# Patient Record
Sex: MALE | Race: WHITE | NOT HISPANIC OR LATINO
[De-identification: names, ages, dates, MRNs, and addresses within clinical notes are randomized per-mention and may not be internally consistent; named-entity substitution may affect disease eponyms.]

---

## 2017-03-15 ENCOUNTER — APPOINTMENT (OUTPATIENT)
Dept: SURGERY | Facility: CLINIC | Age: 82
End: 2017-03-15

## 2017-05-03 ENCOUNTER — APPOINTMENT (OUTPATIENT)
Dept: ENDOCRINOLOGY | Facility: CLINIC | Age: 82
End: 2017-05-03

## 2018-04-14 ENCOUNTER — HOSPITAL ENCOUNTER (EMERGENCY)
Dept: HOSPITAL 74 - JER | Age: 83
Discharge: HOME | End: 2018-04-14
Payer: COMMERCIAL

## 2018-04-14 VITALS — SYSTOLIC BLOOD PRESSURE: 122 MMHG | DIASTOLIC BLOOD PRESSURE: 68 MMHG | HEART RATE: 79 BPM

## 2018-04-14 VITALS — BODY MASS INDEX: 23.1 KG/M2

## 2018-04-14 VITALS — TEMPERATURE: 98.2 F

## 2018-04-14 DIAGNOSIS — N40.1: Primary | ICD-10-CM

## 2018-04-14 DIAGNOSIS — K59.00: ICD-10-CM

## 2018-04-14 DIAGNOSIS — E11.9: ICD-10-CM

## 2018-04-14 DIAGNOSIS — C61: ICD-10-CM

## 2018-04-14 DIAGNOSIS — R33.8: ICD-10-CM

## 2018-04-14 LAB
APPEARANCE UR: (no result)
BILIRUB UR STRIP.AUTO-MCNC: NEGATIVE MG/DL
COLOR UR: (no result)
KETONES UR QL STRIP: (no result)
LEUKOCYTE ESTERASE UR QL STRIP.AUTO: NEGATIVE
MUCOUS THREADS URNS QL MICRO: (no result)
NITRITE UR QL STRIP: NEGATIVE
PH UR: 6 [PH] (ref 5–8)
PROT UR QL STRIP: (no result)
PROT UR QL STRIP: NEGATIVE
RBC # UR STRIP: (no result) /UL
SP GR UR: 1.01 (ref 1–1.03)
UROBILINOGEN UR STRIP-MCNC: NEGATIVE MG/DL (ref 0.2–1)

## 2018-04-14 PROCEDURE — 3E033NZ INTRODUCTION OF ANALGESICS, HYPNOTICS, SEDATIVES INTO PERIPHERAL VEIN, PERCUTANEOUS APPROACH: ICD-10-PCS

## 2018-04-14 PROCEDURE — 0T9B70Z DRAINAGE OF BLADDER WITH DRAINAGE DEVICE, VIA NATURAL OR ARTIFICIAL OPENING: ICD-10-PCS

## 2018-04-14 PROCEDURE — 3E0337Z INTRODUCTION OF ELECTROLYTIC AND WATER BALANCE SUBSTANCE INTO PERIPHERAL VEIN, PERCUTANEOUS APPROACH: ICD-10-PCS

## 2018-04-14 RX ADMIN — HYDROMORPHONE HYDROCHLORIDE ONE: 1 INJECTION, SOLUTION INTRAMUSCULAR; INTRAVENOUS; SUBCUTANEOUS at 10:10

## 2018-04-14 RX ADMIN — HYDROMORPHONE HYDROCHLORIDE ONE MG: 1 INJECTION, SOLUTION INTRAMUSCULAR; INTRAVENOUS; SUBCUTANEOUS at 09:15

## 2018-04-14 NOTE — PDOC
History of Present Illness





- General


Chief Complaint: Pain, Acute


Stated Complaint: ABD PAIN,URINARY PROBLEM


Time Seen by Provider: 04/14/18 07:30


History Source: Patient


Exam Limitations: No Limitations





- History of Present Illness


Travel History: No


Initial Comments: 





04/14/18 09:00


HPI: C3-year-old male presents to the emergency room with complaints of 

suprapubic pressure. He was seen here yesterday with complaints of dysuria and 

inability to void with urinary retention. Attempts were made to pass a Franco 

catheter. Urology on-call, Dr. Marjorie Cowan came in to see the patient. Just 

prior to attempt of placing another Franco with  he went in and use the 

bathroom for bowel movement and was able to urinate a little bit. He was then 

discharged. He now returns because after several hours at home he's unable to 

void again and he has suprapubic pain and pressure.





Chief Compliant:inability to urinate





Pain location:suprapubic


Duration:several hours


Modifying factors:none


Quality:pressure


Radiating:denies


Severity:10 out of 10


Time:Constant





PMH: Diabetes, constipation, enlarged prostate





FH:


Pt has not recently traveled outside the country in the last 30 days. 


Pt has not been in contact with people who have traveled out of the country, in 

contact with people who have been ill with fever, n, v, d. 





SH:


smoking use: NONE


illicit drug use: NONE


alcohol use: NONE


employment/educational status:


sexual history:





PSH: Urinary strictures





Home med use noted on MAR


Allergies:NKA





Immunizations:





PCP: Dr. Truong








04/14/18 09:04








Past History





- Past Medical History


Allergies/Adverse Reactions: 


 Allergies











Allergy/AdvReac Type Severity Reaction Status Date / Time


 


No Known Allergies Allergy   Verified 04/14/18 00:14











Home Medications: 


Ambulatory Orders





NK [No Known Home Medication]  04/14/18 








COPD: No





- Immunization History


Immunization Up to Date: Yes





- Suicide/Smoking/Psychosocial Hx


Smoking History: Never smoked


Have you smoked in the past 12 months: No


Hx Alcohol Use: No


Drug/Substance Use Hx: No





**Review of Systems





- Review of Systems


Able to Perform ROS?: Yes


Constitutional: No: Chills, Fever


HEENTM: No: Symptoms Reported


Respiratory: No: Symptoms reported


Cardiac (ROS): No: Symptoms Reported


ABD/GI: Yes: Abdominal Distended, Abd. Pain w/ defecation, Constipated.  No: 

Diarrhea, Nausea, Vomiting


: Yes: See HPI, Dysuria, Pain, Other (urinary retention).  No: Burning


Musculoskeletal: No: Symptoms Reported


Integumentary: No: Symptoms Reported


Neurological: No: Symptoms reported





*Physical Exam





- Vital Signs


 Last Vital Signs











Temp Pulse Resp BP Pulse Ox


 


 98.2 F   90   18   137/72   98 


 


 04/14/18 05:30  04/14/18 05:30  04/14/18 05:30  04/14/18 05:30  04/14/18 05:30














- Physical Exam


General Appearance: Yes: Apparent Distress


HEENT: positive: Normal Voice


Respiratory/Chest: positive: Lungs Clear


Cardiovascular: positive: Regular Rate


Male Genitalia: positive: normal genitalia, other (unable to void).  negative: 

normal prostate, testicular tenderness, testicular mass, inguinal hernia, hernia

, hematuria


Extremity: positive: Normal Inspection


Integumentary: positive: Dry, Warm





Medical Decision Making





- Medical Decision Making





04/14/18 09:06


Patient initially seen and examined. Patient is stating he is complaining of 

urinary retention and suprapubic pain.


Plan


Attempted to place a 14, 12, French catheter in through meatus. Restriction 

that at the prostate. Unable to pass the prostate and have urinary drainage. 

Lidocaine gel had been attempted. Patient received some pain medication however 

still unable to void.


Contacted  on-call, Dr. Marjorie Cowan to come in and place Franco.


04/14/18 10:18








Gave patient a second dose of morphine for his pain while he awaits Dr. Marjorie Cowan.


Dr. Marjorie Cowan came in and placed a Franco catheter inserted to follow-up in the 

office in one week. Patient is much relieved and is not in any distress. He had 

over 800 mL in his bladder. UA and C&S are being sent.





We'll attempt to reach out for his son to pick him up as he drove here however 

he has been receiving narcotics.


04/14/18 11:56








Was started on antibiotics yesterday for a UTI.  Son at bedside and will send 

him home with franco and leg bag with instructions. 








*DC/Admit/Observation/Transfer


Diagnosis at time of Disposition: 


 Urinary retention








- Discharge Dispostion


Disposition: HOME


Condition at time of disposition: Improved


Admit: No





- Referrals


Referrals: 


Sofia Truong MD [Primary Care Provider] - 





- Patient Instructions


Printed Discharge Instructions:  How to Care for Your Franco Catheter -- Male, 

DI for Urinary Retention in Men


Additional Instructions: 


Discharge instructions





1.  Please follow up with your primary physician within the next few days and 

explain that you have been seen here in the Emergency Room. 





2.  If you experience any worsening of symptoms, please return to the ER





3. Rest, wash hands prior to caring for your Franco catheter and after. Empty 

the bag frequently.





4.  Drink plenty of water including cranberry juice





Continue to take the Flomax and antibiotics as prescribed prior





Follow-up with the urologist next Friday. Call on Monday to schedule an 

appointment.




















- Post Discharge Activity

## 2018-05-03 ENCOUNTER — HOSPITAL ENCOUNTER (OUTPATIENT)
Dept: HOSPITAL 74 - JER | Age: 83
Setting detail: OBSERVATION
LOS: 1 days | Discharge: HOME | End: 2018-05-04
Attending: SPECIALIST | Admitting: SPECIALIST
Payer: COMMERCIAL

## 2018-05-03 VITALS — BODY MASS INDEX: 23 KG/M2

## 2018-05-03 DIAGNOSIS — R26.9: ICD-10-CM

## 2018-05-03 DIAGNOSIS — R42: Primary | ICD-10-CM

## 2018-05-03 DIAGNOSIS — Z79.82: ICD-10-CM

## 2018-05-03 DIAGNOSIS — I10: ICD-10-CM

## 2018-05-03 DIAGNOSIS — E78.5: ICD-10-CM

## 2018-05-03 DIAGNOSIS — N40.0: ICD-10-CM

## 2018-05-03 DIAGNOSIS — Z79.84: ICD-10-CM

## 2018-05-03 DIAGNOSIS — E11.9: ICD-10-CM

## 2018-05-03 LAB
ALBUMIN SERPL-MCNC: 3.9 G/DL (ref 3.4–5)
ALP SERPL-CCNC: 54 U/L (ref 45–117)
ALT SERPL-CCNC: 15 U/L (ref 12–78)
ANION GAP SERPL CALC-SCNC: 8 MMOL/L (ref 8–16)
APPEARANCE UR: CLEAR
AST SERPL-CCNC: 13 U/L (ref 15–37)
BASOPHILS # BLD: 0.3 % (ref 0–2)
BILIRUB SERPL-MCNC: 0.9 MG/DL (ref 0.2–1)
BILIRUB UR STRIP.AUTO-MCNC: NEGATIVE MG/DL
BNP SERPL-MCNC: 217.46 PG/ML (ref 5–450)
BUN SERPL-MCNC: 19 MG/DL (ref 7–18)
CALCIUM SERPL-MCNC: 10.6 MG/DL (ref 8.5–10.1)
CHLORIDE SERPL-SCNC: 107 MMOL/L (ref 98–107)
CO2 SERPL-SCNC: 26 MMOL/L (ref 21–32)
COLOR UR: (no result)
CREAT SERPL-MCNC: 1.1 MG/DL (ref 0.7–1.3)
DEPRECATED RDW RBC AUTO: 14.5 % (ref 11.9–15.9)
EOSINOPHIL # BLD: 1.2 % (ref 0–4.5)
GLUCOSE SERPL-MCNC: 120 MG/DL (ref 74–106)
HCT VFR BLD CALC: 39.5 % (ref 35.4–49)
HGB BLD-MCNC: 13.1 GM/DL (ref 11.7–16.9)
INR BLD: 1.04 (ref 0.82–1.09)
KETONES UR QL STRIP: NEGATIVE
LEUKOCYTE ESTERASE UR QL STRIP.AUTO: NEGATIVE
LYMPHOCYTES # BLD: 21.8 % (ref 8–40)
MCH RBC QN AUTO: 29.3 PG (ref 25.7–33.7)
MCHC RBC AUTO-ENTMCNC: 33.3 G/DL (ref 32–35.9)
MCV RBC: 88 FL (ref 80–96)
MONOCYTES # BLD AUTO: 6.9 % (ref 3.8–10.2)
NEUTROPHILS # BLD: 69.8 % (ref 42.8–82.8)
NITRITE UR QL STRIP: NEGATIVE
PH UR: 7 [PH] (ref 5–8)
PLATELET # BLD AUTO: 238 K/MM3 (ref 134–434)
PMV BLD: 9.1 FL (ref 7.5–11.1)
POTASSIUM SERPLBLD-SCNC: 4.4 MMOL/L (ref 3.5–5.1)
PROT SERPL-MCNC: 7.2 G/DL (ref 6.4–8.2)
PROT UR QL STRIP: NEGATIVE
PROT UR QL STRIP: NEGATIVE
PT PNL PPP: 11.7 SEC (ref 9.7–13)
RBC # BLD AUTO: 4.49 M/MM3 (ref 4–5.6)
SODIUM SERPL-SCNC: 141 MMOL/L (ref 136–145)
SP GR UR: 1.01 (ref 1–1.03)
UROBILINOGEN UR STRIP-MCNC: NEGATIVE MG/DL (ref 0.2–1)
WBC # BLD AUTO: 9 K/MM3 (ref 4–10)

## 2018-05-03 PROCEDURE — G0378 HOSPITAL OBSERVATION PER HR: HCPCS

## 2018-05-03 RX ADMIN — MECLIZINE HYDROCHLORIDE PRN MG: 25 TABLET ORAL at 17:47

## 2018-05-03 NOTE — EKG
Test Reason : 

Blood Pressure : ***/*** mmHG

Vent. Rate : 052 BPM     Atrial Rate : 052 BPM

   P-R Int : 158 ms          QRS Dur : 088 ms

    QT Int : 440 ms       P-R-T Axes : 058 008 029 degrees

   QTc Int : 409 ms

 

SINUS BRADYCARDIA

OTHERWISE NORMAL ECG

NO PREVIOUS ECGS AVAILABLE

Confirmed by LUCIANO MORA MD ([id number]) on 5/3/2018 1:00:43 PM

 

Referred By:             Confirmed By:LUCIANO MORA MD

## 2018-05-03 NOTE — PDOC
History of Present Illness





- General


Chief Complaint: Lightheaded


Stated Complaint: Lightheaded


Time Seen by Provider: 05/03/18 10:53


History Source: Patient


Exam Limitations: No Limitations





- History of Present Illness


Initial Comments: 





05/03/18 13:43


83M with pmh of HTN, HCL, DM2, constipation, and enlarged prostate, who 

presents to the emergency department with acute onset of dizziness/

lightheadedness/vertigo since earlier this morning. Patient reports being at 

baseline last around 5:30 when he woke up to urinate. He woke up 2 hours later 

and upon sitting on the side of the bed, fell like he was off balance, "off his 

equilibrium". 


 


Denies abdominal pain, nausea, vomiting, diarrhea, constipation, melena, or 

hematochezia. He denies any fever, chills, cough, headache, lightheadedness, or 

focal weakness. 


Previously evaluated by neurologist, Dr. Cazares, for difficulty with balance over 

the past year. At the time the patient had an MRI done, which was negative. The 

patient denies any history of strokes or trauma. He denies any chest pain, 

shortness of breath, diaphoresis, or palpitations.


05/03/18 13:46








Past History





- Past Medical History


Allergies/Adverse Reactions: 


 Allergies











Allergy/AdvReac Type Severity Reaction Status Date / Time


 


No Known Allergies Allergy   Verified 04/14/18 00:14











Home Medications: 


Ambulatory Orders





Amlodipine Besylate 5 mg PO DAILY 05/03/18 


Aspirin [ASA -] 81 mg PO DAILY 05/03/18 


Cholecalciferol (Vitamin D3) [Vitamin D -] 400 units PO DAILY 05/03/18 


Metformin HCl [Glucophage] 500 mg PO DAILY 05/03/18 


Pravastatin Sodium [Pravachol (Nf)] 40 mg PO HS 05/03/18 


Sennosides [Senna -] PO HS 05/03/18 


Sitagliptin Phosphate [Januvia] 100 mg PO DAILY 05/03/18 


Vitamin B Complex 1 tab PO DAILY 05/03/18 








COPD: No





- Immunization History


Immunization Up to Date: Yes





- Suicide/Smoking/Psychosocial Hx


Smoking History: Never smoked


Have you smoked in the past 12 months: No


Information on smoking cessation initiated: No


Hx Alcohol Use: Yes (social)


Drug/Substance Use Hx: No





**Review of Systems





- Review of Systems


Able to Perform ROS?: Yes


Is the patient limited English proficient: No


Constitutional: No: Symptoms Reported


HEENTM: No: Symptoms Reported


Respiratory: No: Symptoms reported


Cardiac (ROS): No: Symptoms Reported


ABD/GI: No: Symptoms Reported


: No: Symptoms Reported


Musculoskeletal: No: Symptoms Reported


Integumentary: No: Symptoms Reported


Neurological: Yes: Unsteady Gait.  No: Headache, Numbness


Hematologic/Lymphatic: No: See HPI


All Other Systems: Reviewed and Negative





*Physical Exam





- Vital Signs


 Last Vital Signs











Temp Pulse Resp BP Pulse Ox


 


 98.1 F   68   16   155/89   100 


 


 05/03/18 10:47  05/03/18 10:47  05/03/18 10:47  05/03/18 10:47  05/03/18 10:47














- Physical Exam


General Appearance: Yes: Nourished, Appropriately Dressed.  No: Apparent 

Distress


HEENT: positive: EOMI, RUSH, Normal ENT Inspection


Neck: negative: Tender


Respiratory/Chest: positive: Lungs Clear, Normal Breath Sounds.  negative: 

Chest Tender, Respiratory Distress


Cardiovascular: positive: Regular Rhythm, Regular Rate, S1, S2


Gastrointestinal/Abdominal: positive: Normal Bowel Sounds, Flat, Soft.  negative

: Tender


Musculoskeletal: positive: Normal Inspection.  negative: CVA Tenderness


Extremity: positive: Normal Capillary Refill, Normal Inspection, Normal Range 

of Motion


Integumentary: positive: Normal Color, Dry, Warm


Neurologic: positive: CNs II-XII NML intact, Fully Oriented, Alert, Normal Mood/

Affect, Other (positive romberg, gait deviated to the left, off balance.)





ED Treatment Course





- LABORATORY


CBC & Chemistry Diagram: 


 05/03/18 11:30





 05/03/18 11:30





Medical Decision Making





- Medical Decision Making





05/03/18 13:52


83m\ with vertigo and some evidence of cerebella dysfuntion. 


Will order basic labs, ct head and admit for MRI


05/03/18 16:26


All labs and UA negative. CT negative. 


Spoke to MCKAY Conti for Dr. Herron who accepted the patient . 


PLaced consult and spoke to Dr. Salas for Neurology








*DC/Admit/Observation/Transfer


Diagnosis at time of Disposition: 


 Vertigo








- Discharge Dispostion


Admit: Yes





- Referrals





- Patient Instructions





- Post Discharge Activity

## 2018-05-03 NOTE — PDOC
Attending Attestation





- Resident


Resident Name: Romeo Carrera





- ED Attending Attestation


I have performed the following: I have examined & evaluated the patient, The 

case was reviewed & discussed with the resident, I agree w/resident's findings 

& plan, Exceptions are as noted





<Ирина May - Last Filed: 05/03/18 11:43>





- HPI


HPI: 





05/03/18 11:56


The patient is a 83 year old male, with a significant past medical history of 

hypertension, hyperlipidemia, diabetes, constipation, and enlarged prostate, 

who presents to the emergency department with acute onset of dizziness since 

earlier this morning. Patient reports going to bed at his baseline last night 

and waking up every two hours. Patient reports he was last well at around 05:30

, but then around 07:30 when he went to the bathroom he began to feel as if his 

equilibrium was off and he was falling toward the left. He denies any 

associated abdominal pain, nausea, vomiting, diarrhea, constipation, melena, or 

hematochezia. He denies any fever, chills, cough, headache, lightheadedness, or 

focal weakness. The patient reports he has been evaluated by a neurologist, Dr. Cazares, for difficulty with balance over the past year. At the time the patient 

had an MRI done, which was negative. The patient denies any history of strokes 

or trauma. He denies any chest pain, shortness of breath, diaphoresis, or 

palpitations.





Allergies: NKDA


PCP: Dr. Truong





- Physicial Exam


PE: 





05/03/18 11:56


GENERAL: Awake, alert, and fully oriented, in no acute distress


HEAD: No signs of trauma


EYES: PERRLA, EOMI, sclera anicteric, conjunctiva clear


ENT: Auricles normal inspection, hearing grossly normal, nares patent. Moist 

mucosa


NECK: Normal ROM, supple, no lymphadenopathy, JVD, or masses


LUNGS: Breath sounds equal, clear to auscultation bilaterally.  No wheezes, and 

no crackles


HEART: Regular rate and rhythm, normal S1 and S2, no murmurs, rubs or gallops


ABDOMEN: Soft, nontender, normoactive bowel sounds.  No guarding, no rebound.  

No masses


EXTREMITIES: Normal range of motion, no edema.  No clubbing or cyanosis. No 

cords, erythema, or tenderness. DP/PT pulses 2+ and symmetric. 


NEUROLOGICAL: Positive romberg's. Unsteady gait. Moves all extremities.  Normal 

speech. No focal weakness. Cranial nerves II-XII are grossly intact. Bilateral 

hand tremors.


SKIN: Warm, Dry, normal turgor, no rashes or lesions noted.








- Medical Decision Making





05/03/18 11:57





Documentation prepared by Ry Rodrigues, acting as medical scribe for Ирина May MD.





First call placed place to Dr. Truong at 14:16. Awaiting call back.


Case discussed wih MCKAY Conti. Dr. Truong requests Dr. Salas for Neurology 

consult.





First call placed to Dr. Salas at 14:26. Awaiting call back.





<Ry Rodrigues - Last Filed: 05/03/18 14:29>





ED Treatment Course





- LABORATORY


CBC & Chemistry Diagram: 


 05/03/18 11:30





 05/03/18 11:30





- ADDITIONAL ORDERS


Additional order review: 


 Laboratory  Results











  05/03/18 05/03/18 05/03/18





  12:00 11:45 11:30


 


PT with INR    11.70


 


INR    1.04


 


PTT (Actin FS)   29.4 


 


Sodium   


 


Potassium   


 


Chloride   


 


Carbon Dioxide   


 


Anion Gap   


 


BUN   


 


Creatinine   


 


Creat Clearance w eGFR   


 


Random Glucose   


 


Calcium   


 


Total Bilirubin   


 


AST   


 


ALT   


 


Alkaline Phosphatase   


 


B-Natriuretic Peptide   


 


Total Protein   


 


Albumin   


 


Urine Color  Ltyellow  


 


Urine Appearance  Clear  


 


Urine pH  7.0  


 


Ur Specific Gravity  1.012  


 


Urine Protein  Negative  


 


Urine Glucose (UA)  Negative  


 


Urine Ketones  Negative  


 


Urine Blood  Negative  


 


Urine Nitrite  Negative  


 


Urine Bilirubin  Negative  


 


Urine Urobilinogen  Negative  


 


Ur Leukocyte Esterase  Negative  














  05/03/18





  11:30


 


PT with INR 


 


INR 


 


PTT (Actin FS) 


 


Sodium  141


 


Potassium  4.4


 


Chloride  107


 


Carbon Dioxide  26


 


Anion Gap  8


 


BUN  19 H


 


Creatinine  1.1


 


Creat Clearance w eGFR  > 60


 


Random Glucose  120 H


 


Calcium  10.6 H


 


Total Bilirubin  0.9


 


AST  13 L


 


ALT  15


 


Alkaline Phosphatase  54


 


B-Natriuretic Peptide  217.46


 


Total Protein  7.2


 


Albumin  3.9


 


Urine Color 


 


Urine Appearance 


 


Urine pH 


 


Ur Specific Gravity 


 


Urine Protein 


 


Urine Glucose (UA) 


 


Urine Ketones 


 


Urine Blood 


 


Urine Nitrite 


 


Urine Bilirubin 


 


Urine Urobilinogen 


 


Ur Leukocyte Esterase 








 











  05/03/18





  11:30


 


RBC  4.49


 


MCV  88.0


 


MCHC  33.3


 


RDW  14.5


 


MPV  9.1


 


Neutrophils %  69.8


 


Lymphocytes %  21.8


 


Monocytes %  6.9


 


Eosinophils %  1.2


 


Basophils %  0.3














- RADIOLOGY


Radiograph Interpretation: 





05/03/18 14:29


EXAM: Head CT


IMPRESSION: Moderate atrophy and mild periventricular chronic microvascular 

ischemic disease changes without gross evidence of acute intracranial 

pathology. Correlate clinically to determine further evaluation and follow-up. 





- Medications


Given in the ED: 


ED Medications














Discontinued Medications














Generic Name Dose Route Start Last Admin





  Trade Name Freq  PRN Reason Stop Dose Admin


 


Sodium Chloride  1,000 mls @ 1,000 mls/hr  05/03/18 11:24  05/03/18 12:06





  Normal Saline -  IV  05/03/18 12:23  1,000 mls/hr





  ASDIR STA   Administration


 


Meclizine HCl  25 mg  05/03/18 11:24  05/03/18 12:06





  Antivert -  PO  05/03/18 11:25  25 mg





  ONCE ONE   Administration


 


Ondansetron HCl  4 mg  05/03/18 11:24  05/03/18 12:06





  Zofran Injection  IVPUSH  05/03/18 11:25  4 mg





  ONCE ONE   Administration














<Ry Rodrigues - Last Filed: 05/03/18 14:29>

## 2018-05-03 NOTE — CON.NEURO
Consult





- History of Present Illness


History of Present Illness: 


83M with pmh of HTN, HCL, DM2, constipation, and enlarged prostate, who 

presents to the emergency department with acute onset of dizziness/

lightheadedness/vertigo since earlier this morning. Patient reports being at 

baseline last around 5:30 when he woke up to urinate. He woke up 2 hours later 

and upon sitting on the side of the bed, fell like he was off balance, "off his 

equilibrium". 


 


Denies abdominal pain, nausea, vomiting, diarrhea, constipation, melena, or 

hematochezia. He denies any fever, chills, cough, headache, lightheadedness, or 

focal weakness. 


Previously evaluated by neurologist, Dr. Cazares, for difficulty with balance over 

the past year. At the time the patient had an MRI done, which was negative. The 

patient denies any history of strokes or trauma. He denies any chest pain, 

shortness of breath, diaphoresis, or palpitations.





CT HD


IMPRESSION: Moderate atrophy and mild periventricular chronic microvascular 

ischemic disease changes without gross evidence of acute intracranial 

pathology. Correlate clinically to determine further evaluation and follow-up. 








- Alcohol/Substance Use


Hx Alcohol Use: Yes (social)





- Smoking History


Smoking history: Never smoked


Have you smoked in the past 12 months: No





Home Medications





- Allergies


Allergies/Adverse Reactions: 


 Allergies











Allergy/AdvReac Type Severity Reaction Status Date / Time


 


No Known Allergies Allergy   Verified 04/14/18 00:14














- Home Medications


Home Medications: 


Ambulatory Orders





Amlodipine Besylate 5 mg PO DAILY 05/03/18 


Aspirin [ASA -] 81 mg PO DAILY 05/03/18 


Cholecalciferol (Vitamin D3) [Vitamin D -] 400 units PO DAILY 05/03/18 


Metformin HCl [Glucophage] 500 mg PO DAILY 05/03/18 


Pravastatin Sodium [Pravachol (Nf)] 40 mg PO HS 05/03/18 


Sennosides [Senna -] PO HS 05/03/18 


Sitagliptin Phosphate [Januvia] 100 mg PO DAILY 05/03/18 


Vitamin B Complex 1 tab PO DAILY 05/03/18 











Physical Exam-Neuro


Vital Signs: 


 Vital Signs











Temperature  98.1 F   05/03/18 10:47


 


Pulse Rate  68   05/03/18 10:47


 


Respiratory Rate  16   05/03/18 10:47


 


Blood Pressure  155/89   05/03/18 10:47


 


O2 Sat by Pulse Oximetry (%)  100   05/03/18 10:47











Labs: 


 CBC, BMP





 05/03/18 11:30 





 05/03/18 11:30 





 INR, PTT











INR  1.04  (0.82-1.09)   05/03/18  11:30    














- Neuro Exam


Level Of Consciousness: Yes: Alert (awake , alert oriented , EOMi, no nystagmus

, no facial, no dysmetria or ataxia, when he stands unstaedy , )





Imaging





- Results


Cat Scan: Report Reviewed, Image Reviewed





Problem List





- Problems


(1) Abnormal gait


Code(s): R26.9 - UNSPECIFIED ABNORMALITIES OF GAIT AND MOBILITY   





(2) Vertigo


Code(s): R42 - DIZZINESS AND GIDDINESS   





Assessment/Plan


83M with pmh of HTN, HCL, DM2, constipation, and enlarged prostate, who 

presents to the emergency department with acute onset of dizziness/

lightheadedness/vertigo since earlier this morning- r/o central vertigo , r/o 

CVA ;positional dizziness favors peripheral origin 


start ASA 


check MRI BRAIN, 


consider ENT 








Dr Salas

## 2018-05-03 NOTE — HP
Admitting History and Physical





- Admission


Chief Complaint: cant get up / dizzy


History of Present Illness: 











History of Present Illness: 


83M with pmh of HTN, HCL, DM2, constipation, and enlarged prostate, who 

presents to the emergency department with acute onset of dizziness/

lightheadedness/vertigo since earlier this morning. Patient reports being at 

baseline last around 5:30 when he woke up to urinate. He woke up 2 hours later 

and upon sitting on the side of the bed, fell like he was off balance, "off his 

equilibrium". 


 


Denies abdominal pain, nausea, vomiting, diarrhea, constipation, melena, or 

hematochezia. He denies any fever, chills, cough, headache, lightheadedness, or 

focal weakness. 


Previously evaluated by neurologist, Dr. Cazares, for difficulty with balance over 

the past year. At the time the patient had an MRI done, which was negative. The 

patient denies any history of strokes or trauma. He denies any chest pain, 

shortness of breath, diaphoresis, or palpitations.





CT HD


IMPRESSION: Moderate atrophy and mild periventricular chronic microvascular 

ischemic disease changes without gross evidence of acute intracranial 

pathology. Correlate clinically to determine further evaluation and follow-up. 








History Source: Patient, Family Member, Medical Record, Caregiver


Limitations to Obtaining History: No Limitations





- Smoking History


Smoking history: Never smoked


Have you smoked in the past 12 months: No





- Alcohol/Substance Use


Hx Alcohol Use: Yes (social)


History of Substance Use: reports: None





- Social History


Usual Living Arrangement: Yes: With Spouse


ADL: Family Assistance


History of Recent Travel: No





Home Medications





- Allergies


Allergies/Adverse Reactions: 


 Allergies











Allergy/AdvReac Type Severity Reaction Status Date / Time


 


No Known Allergies Allergy   Verified 04/14/18 00:14














- Home Medications


Home Medications: 


Ambulatory Orders





Amlodipine Besylate 5 mg PO DAILY 05/03/18 


Aspirin [ASA -] 81 mg PO DAILY 05/03/18 


Cholecalciferol (Vitamin D3) [Vitamin D -] 400 units PO DAILY 05/03/18 


Metformin HCl [Glucophage] 500 mg PO DAILY 05/03/18 


Pravastatin Sodium [Pravachol -] 40 mg PO HS 05/03/18 


Sennosides [Senna -] PO HS 05/03/18 


Sitagliptin Phosphate [Januvia] 100 mg PO DAILY 05/03/18 


Vitamin B Complex 1 tab PO DAILY 05/03/18 


Acetaminophen [Tylenol .Regular Strength -] 650 mg PO Q6H PRN  tablet 05/04/18 


Amlodipine Besylate [Norvasc -] 5 mg PO DAILY  tablet 05/04/18 


Aspirin Coated [Ecotrin -] 81 mg PO DAILY  tablet.ec 05/04/18 


Atorvastatin Ca [Lipitor] 40 mg PO HS  tablet 05/04/18 


Meclizine HCl [Antivert -] 25 mg PO Q8H 30 Days #90 tablet 05/04/18 


Sitagliptin Phosphate [Januvia -] 100 mg PO DAILY@0700  tablet 05/04/18 


metFORMIN HCL [Glucophage -] 500 mg PO DAILY@0700  tablet 05/04/18 











Review of Systems





- Review of Systems


Constitutional: reports: Weakness.  denies: Chills, Diaphoresis, Fever, Malaise

, Night Sweats


Eyes: denies: Blurred Vision, Double Vision, Photophobia


HENT: denies: Difficult Swallowing, Ear Pain, Nasal Congestion


Neck: reports: No Symptoms


Cardiovascular: denies: Chest Pain, Palpitations, Shortness of Breath


Respiratory: denies: Cough


Gastrointestinal: reports: No Symptoms


Genitourinary: reports: No Symptoms


Breasts: reports: No Symptoms Reported


Musculoskeletal: reports: No Symptoms


Integumentary: reports: No Symptoms


Neurological: reports: Other (vertigo)


Hematology/Lymphatic: reports: No Symptoms


Psychiatric: reports: No Symptoms





Physical Examination


Vital Signs: 


 Vital Signs











Temperature  98.2 F   05/03/18 15:30


 


Pulse Rate  68   05/03/18 15:30


 


Respiratory Rate  16   05/03/18 15:30


 


Blood Pressure  145/85   05/03/18 15:30


 


O2 Sat by Pulse Oximetry (%)  96   05/03/18 15:30











Constitutional: Yes: Well Nourished, No Distress, Mild Distress


Eyes: Yes: Conjunctiva Clear, EOM Intact


HENT: Yes: Atraumatic, Normocephalic


Neck: Yes: Supple, Trachea Midline


Cardiovascular: Yes: Regular Rate and Rhythm


Respiratory: Yes: Regular


Gastrointestinal: Yes: Normal Bowel Sounds


...Rectal Exam: Yes: Deferred


Renal/: Yes: WNL


Breast(s): Yes: WNL


Musculoskeletal: Yes: WNL, Muscle Weakness


Extremities: Yes: WNL


Edema: No


Peripheral Pulses WNL: Yes


Integumentary: Yes: WNL


Neurological: Yes: Alert, Oriented


...Motor Strength: WNL


Psychiatric: Yes: Alert, Oriented


Labs: 


 CBC, BMP





 05/03/18 11:30 





 05/03/18 11:30 











Problem List





- Problems


(1) Vertigo


Assessment/Plan: 


neurology evaluation 


ENT evaluation 


MRI 


start meclizine 





Code(s): R42 - DIZZINESS AND GIDDINESS   





(2) Hypertension


Code(s): I10 - ESSENTIAL (PRIMARY) HYPERTENSION   





(3) Hyperlipemia


Assessment/Plan: 


continue meds


Code(s): E78.5 - HYPERLIPIDEMIA, UNSPECIFIED   





(4) Abnormal gait


Assessment/Plan: 


neurology evaluation 


Code(s): R26.9 - UNSPECIFIED ABNORMALITIES OF GAIT AND MOBILITY

## 2018-05-04 VITALS — HEART RATE: 73 BPM | DIASTOLIC BLOOD PRESSURE: 80 MMHG | TEMPERATURE: 98.6 F | SYSTOLIC BLOOD PRESSURE: 121 MMHG

## 2018-05-04 LAB
ANION GAP SERPL CALC-SCNC: 5 MMOL/L (ref 8–16)
BASOPHILS # BLD: 0.2 % (ref 0–2)
BUN SERPL-MCNC: 18 MG/DL (ref 7–18)
CALCIUM SERPL-MCNC: 10.4 MG/DL (ref 8.5–10.1)
CHLORIDE SERPL-SCNC: 107 MMOL/L (ref 98–107)
CHOLEST SERPL-MCNC: 124 MG/DL (ref 50–200)
CO2 SERPL-SCNC: 29 MMOL/L (ref 21–32)
CREAT SERPL-MCNC: 1 MG/DL (ref 0.7–1.3)
DEPRECATED PREALB SERPL-CCNC: 27.3 MG/DL (ref 20–40)
DEPRECATED RDW RBC AUTO: 14.4 % (ref 11.9–15.9)
EOSINOPHIL # BLD: 2.1 % (ref 0–4.5)
GLUCOSE SERPL-MCNC: 104 MG/DL (ref 74–106)
HCT VFR BLD CALC: 38.8 % (ref 35.4–49)
HDLC SERPL-MCNC: 34 MG/DL (ref 40–60)
HGB BLD-MCNC: 13 GM/DL (ref 11.7–16.9)
LYMPHOCYTES # BLD: 22.7 % (ref 8–40)
MAGNESIUM SERPL-MCNC: 2.4 MG/DL (ref 1.8–2.4)
MCH RBC QN AUTO: 29.5 PG (ref 25.7–33.7)
MCHC RBC AUTO-ENTMCNC: 33.6 G/DL (ref 32–35.9)
MCV RBC: 87.9 FL (ref 80–96)
MONOCYTES # BLD AUTO: 9.2 % (ref 3.8–10.2)
NEUTROPHILS # BLD: 65.8 % (ref 42.8–82.8)
PLATELET # BLD AUTO: 229 K/MM3 (ref 134–434)
PMV BLD: 9.3 FL (ref 7.5–11.1)
POTASSIUM SERPLBLD-SCNC: 4.3 MMOL/L (ref 3.5–5.1)
RBC # BLD AUTO: 4.42 M/MM3 (ref 4–5.6)
SODIUM SERPL-SCNC: 141 MMOL/L (ref 136–145)
TRIGL SERPL-MCNC: 174 MG/DL (ref 35–160)
WBC # BLD AUTO: 8.5 K/MM3 (ref 4–10)

## 2018-05-04 PROCEDURE — 3E033GC INTRODUCTION OF OTHER THERAPEUTIC SUBSTANCE INTO PERIPHERAL VEIN, PERCUTANEOUS APPROACH: ICD-10-PCS | Performed by: SPECIALIST

## 2018-05-04 PROCEDURE — 3E0337Z INTRODUCTION OF ELECTROLYTIC AND WATER BALANCE SUBSTANCE INTO PERIPHERAL VEIN, PERCUTANEOUS APPROACH: ICD-10-PCS | Performed by: SPECIALIST

## 2018-05-04 RX ADMIN — MECLIZINE HYDROCHLORIDE PRN MG: 25 TABLET ORAL at 10:17

## 2018-05-04 NOTE — DS
Physical Examination


Vital Signs: 


 Vital Signs











Temperature  98.6 F   05/04/18 15:19


 


Pulse Rate  73   05/04/18 15:19


 


Respiratory Rate  18   05/04/18 15:19


 


Blood Pressure  121/80   05/04/18 15:19


 


O2 Sat by Pulse Oximetry (%)  97   05/04/18 09:00











Findings/Remarks: 








05/03/18 13:43


83M with pmh of HTN, HCL, DM2, constipation, and enlarged prostate, who 

presents to the emergency department with acute onset of dizziness/

lightheadedness/vertigo since earlier this morning. Patient reports being at 

baseline last around 5:30 when he woke up to urinate. He woke up 2 hours later 

and upon sitting on the side of the bed, fell like he was off balance, "off his 

equilibrium". 


 


Denies abdominal pain, nausea, vomiting, diarrhea, constipation, melena, or 

hematochezia. He denies any fever, chills, cough, headache, lightheadedness, or 

focal weakness. 


Previously evaluated by neurologist, Dr. Cazares, for difficulty with balance over 

the past year. At the time the patient had an MRI done, which was negative. The 

patient denies any history of strokes or trauma. He denies any chest pain, 

shortness of breath, diaphoresis, or palpitations.








#Vertigo


 improved - resolved 


 continue meclizine for 2 weeks


 ENT and Neuro evaluation completed 


 MRI  negative 


 ASA started 


 can follow up with ENT








#HLD


   Cont statin





#HTN


   BP stable


   Cont Amlodipine





#DM


   Cont Januvia and Metformin





Constitutional: Yes: Well Nourished, No Distress, Calm


Eyes: Yes: WNL, Conjunctiva Clear, EOM Intact


HENT: Yes: Atraumatic, Normocephalic


Neck: Yes: Supple, Trachea Midline


Cardiovascular: Yes: Regular Rate and Rhythm


Respiratory: Yes: Regular


Gastrointestinal: Yes: Normal Bowel Sounds


Renal/: Yes: WNL


Breast(s): Yes: WNL


Musculoskeletal: Yes: WNL


Extremities: Yes: WNL


Edema: No


Peripheral Pulses WNL: Yes


Neurological: Yes: Alert, Oriented


...Motor Strength: WNL


Psychiatric: Yes: Alert, Oriented


Labs: 


 CBC, BMP





 05/04/18 05:35 





 05/04/18 05:35 











Discharge Summary


Reason For Visit: VERTIGO


Current Active Problems





Abnormal gait (Acute)


Vertigo (Acute)








Condition: Improved





- Instructions


Referrals: 


Sofia Truong MD [Primary Care Provider] - 


Disposition: HOME





- Home Medications


Comprehensive Discharge Medication List: 


Ambulatory Orders











Cholecalciferol (Vitamin D3) [Vitamin D -] 400 units PO DAILY 05/03/18 


Metformin HCl [Glucophage] 500 mg PO DAILY 05/03/18 


Pravastatin Sodium [Pravachol -] 40 mg PO HS 05/03/18 


Sennosides [Senna -] PO HS 05/03/18 


Sitagliptin Phosphate [Januvia] 100 mg PO DAILY 05/03/18 


Vitamin B Complex 1 tab PO DAILY 05/03/18 


Acetaminophen [Tylenol .Regular Strength -] 650 mg PO Q6H PRN  tablet 05/04/18 


Amlodipine Besylate [Norvasc -] 5 mg PO DAILY  tablet 05/04/18 


Aspirin Coated [Ecotrin -] 81 mg PO DAILY  tablet.ec 05/04/18 


Meclizine HCl [Antivert -] 25 mg PO Q8H 30 Days #90 tablet 05/04/18

## 2018-05-04 NOTE — PN
Progress Note (short form)





- Note


Progress Note: 





84 y/o male admitted for Vertigo. Denies vertigo and pain at present.





 Vital Signs











 Period  Temp  Pulse  Resp  BP Sys/Coleman  Pulse Ox


 


 Last 24 Hr  97.7 F-98.4 F  53-68  16-20  122-157/55-85  96-98








 CBC, BMP





 05/04/18 05:35 





 05/04/18 05:35 





HEENT- Normocephalic


Neck- supple


Lungs- CTAB


Heart- S1/S2


Abd- Soft, NT


Ext- Neg LE edema





Active Medications





Acetaminophen (Tylenol -)  650 mg PO Q6H PRN


   PRN Reason: PAIN LEVEL 1-5


Amlodipine Besylate (Norvasc -)  5 mg PO DAILY [facility]


   Last Admin: 05/04/18 10:10 Dose:  5 mg


Aspirin (Ecotrin -)  81 mg PO DAILY [facility]


   Last Admin: 05/04/18 10:10 Dose:  81 mg


Atorvastatin Calcium (Lipitor -)  40 mg PO HS [facility]


   Last Admin: 05/03/18 22:00 Dose:  40 mg


Meclizine HCl (Antivert -)  25 mg PO Q8H PRN


   PRN Reason: VERTIGO


   Last Admin: 05/04/18 10:17 Dose:  25 mg


Metformin HCl (Glucophage -)  500 mg PO DAILY@0700 [facility]


   Last Admin: 05/04/18 06:42 Dose:  500 mg


Ondansetron HCl (Zofran -)  4 mg PO Q6H PRN


   PRN Reason: NAUSEA AND/OR VOMITING


Sitagliptin Phosphate (Januvia -)  100 mg PO DAILY@0700 [facility]


   Last Admin: 05/04/18 06:42 Dose:  100 mg





#Vertigo


Stable at present


Cont Meclizine


ASA started


Appreciate ENT and Neuro consults





#HLD


Cont statin





#HTN


BP stable


Cont Amlodipine





#DM


Cont Januvia and Metformin

## 2018-05-04 NOTE — CONSULT
Consult - text type





- Consultation


Consultation Note: 





ENT Consult





82 yo man with sudden onset of whirling vertigo yesterday.  Was primarily 

motion related, and lasted most of the day.  Hx of occasional brief vertigo 

before this for a long time.  No hearing loss.  Feels better today.  No recent 

RTIs.





P/WDWN WM sitting comfortably in a wheelchair in Radiology, in NAD


Gaze: slight right beating nystagmus in right gaze


AD Wax


AS EAC/TM normal


Nose normal


OC/OP normal


Neck no masses, nontender





Imp: vertigo, possibly due to vestibular neuritis, improved


Earwax





Recommend outpatient follow up for ear cleaning and possible further vestibular 

testing


Reconsult if worsens